# Patient Record
Sex: MALE | Race: WHITE | NOT HISPANIC OR LATINO | URBAN - METROPOLITAN AREA
[De-identification: names, ages, dates, MRNs, and addresses within clinical notes are randomized per-mention and may not be internally consistent; named-entity substitution may affect disease eponyms.]

---

## 2018-09-26 ENCOUNTER — APPOINTMENT (RX ONLY)
Dept: URBAN - METROPOLITAN AREA CLINIC 63 | Facility: CLINIC | Age: 73
Setting detail: DERMATOLOGY
End: 2018-09-26

## 2018-09-26 DIAGNOSIS — D485 NEOPLASM OF UNCERTAIN BEHAVIOR OF SKIN: ICD-10-CM

## 2018-09-26 PROBLEM — D48.5 NEOPLASM OF UNCERTAIN BEHAVIOR OF SKIN: Status: ACTIVE | Noted: 2018-09-26

## 2018-09-26 PROCEDURE — ? BIOPSY BY SHAVE METHOD

## 2018-09-26 PROCEDURE — 11100: CPT

## 2018-09-26 ASSESSMENT — PAIN INTENSITY VAS: HOW INTENSE IS YOUR PAIN 0 BEING NO PAIN, 10 BEING THE MOST SEVERE PAIN POSSIBLE?: NO PAIN

## 2018-09-26 ASSESSMENT — LOCATION SIMPLE DESCRIPTION DERM: LOCATION SIMPLE: LEFT FOREARM

## 2018-09-26 ASSESSMENT — LOCATION ZONE DERM: LOCATION ZONE: ARM

## 2018-09-26 ASSESSMENT — LOCATION DETAILED DESCRIPTION DERM: LOCATION DETAILED: LEFT DISTAL ULNAR DORSAL FOREARM

## 2018-09-26 NOTE — PROCEDURE: BIOPSY BY SHAVE METHOD
Detail Level: Detailed
X Size Of Lesion In Cm: 0
Electrodesiccation Text: The wound bed was treated with electrodesiccation after the biopsy was performed.
Billing Type: Third-Party Bill
Destruction After The Procedure: No
Anesthesia Volume In Cc (Will Not Render If 0): 0.5
Curettage Text: The wound bed was treated with curettage after the biopsy was performed.
Type Of Destruction Used: Curettage
Notification Instructions: Patient will be notified of biopsy results. However, patient instructed to call the office if not contacted within 2 weeks.
Consent: Written consent was obtained and risks were reviewed including but not limited to scarring, infection, bleeding, scabbing, incomplete removal, nerve damage and allergy to anesthesia.
Biopsy Method: Dermablade
Dressing: bandage
Depth Of Biopsy: dermis
Biopsy Type: H and E
Post-Care Instructions: I reviewed with the patient in detail post-care instructions. Patient is to keep the biopsy site dry overnight, and then apply bacitracin twice daily until healed. Patient may apply hydrogen peroxide soaks to remove any crusting.
Electrodesiccation And Curettage Text: The wound bed was treated with electrodesiccation and curettage after the biopsy was performed.
Hemostasis: Drysol
Silver Nitrate Text: The wound bed was treated with silver nitrate after the biopsy was performed.
Lab: [id number]
Was A Bandage Applied: Yes
Cryotherapy Text: The wound bed was treated with cryotherapy after the biopsy was performed.
Wound Care: Petrolatum
Anesthesia Type: 1% lidocaine with epinephrine
Lab Facility: [id number]

## 2019-05-15 ENCOUNTER — APPOINTMENT (RX ONLY)
Dept: URBAN - METROPOLITAN AREA CLINIC 63 | Facility: CLINIC | Age: 74
Setting detail: DERMATOLOGY
End: 2019-05-15

## 2019-05-15 DIAGNOSIS — F17.200 NICOTINE DEPENDENCE, UNSPECIFIED, UNCOMPLICATED: ICD-10-CM

## 2019-05-15 DIAGNOSIS — D18.0 HEMANGIOMA: ICD-10-CM

## 2019-05-15 DIAGNOSIS — L57.0 ACTINIC KERATOSIS: ICD-10-CM

## 2019-05-15 DIAGNOSIS — L82.1 OTHER SEBORRHEIC KERATOSIS: ICD-10-CM

## 2019-05-15 DIAGNOSIS — L57.8 OTHER SKIN CHANGES DUE TO CHRONIC EXPOSURE TO NONIONIZING RADIATION: ICD-10-CM

## 2019-05-15 DIAGNOSIS — D22 MELANOCYTIC NEVI: ICD-10-CM

## 2019-05-15 PROBLEM — D22.5 MELANOCYTIC NEVI OF TRUNK: Status: ACTIVE | Noted: 2019-05-15

## 2019-05-15 PROBLEM — D18.01 HEMANGIOMA OF SKIN AND SUBCUTANEOUS TISSUE: Status: ACTIVE | Noted: 2019-05-15

## 2019-05-15 PROCEDURE — ? COUNSELING

## 2019-05-15 PROCEDURE — 17000 DESTRUCT PREMALG LESION: CPT

## 2019-05-15 PROCEDURE — 99214 OFFICE O/P EST MOD 30 MIN: CPT | Mod: 25

## 2019-05-15 PROCEDURE — ? LIQUID NITROGEN

## 2019-05-15 ASSESSMENT — LOCATION DETAILED DESCRIPTION DERM
LOCATION DETAILED: LEFT SUPERIOR MEDIAL UPPER BACK
LOCATION DETAILED: RIGHT PROXIMAL DORSAL FOREARM
LOCATION DETAILED: LEFT DISTAL DORSAL FOREARM
LOCATION DETAILED: LEFT LATERAL FOREHEAD
LOCATION DETAILED: EPIGASTRIC SKIN
LOCATION DETAILED: RIGHT SUPERIOR UPPER BACK
LOCATION DETAILED: RIGHT INFERIOR UPPER BACK

## 2019-05-15 ASSESSMENT — PAIN INTENSITY VAS
HOW INTENSE IS YOUR PAIN 0 BEING NO PAIN, 10 BEING THE MOST SEVERE PAIN POSSIBLE?: NO PAIN
HOW INTENSE IS YOUR PAIN 0 BEING NO PAIN, 10 BEING THE MOST SEVERE PAIN POSSIBLE?: 2/10 PAIN

## 2019-05-15 ASSESSMENT — LOCATION SIMPLE DESCRIPTION DERM
LOCATION SIMPLE: LEFT FOREARM
LOCATION SIMPLE: LEFT FOREHEAD
LOCATION SIMPLE: RIGHT UPPER BACK
LOCATION SIMPLE: ABDOMEN
LOCATION SIMPLE: LEFT UPPER BACK
LOCATION SIMPLE: RIGHT FOREARM

## 2019-05-15 ASSESSMENT — LOCATION ZONE DERM
LOCATION ZONE: ARM
LOCATION ZONE: TRUNK
LOCATION ZONE: FACE

## 2019-05-15 NOTE — PROCEDURE: COUNSELING
Detail Level: Generalized
Quality 226: Preventive Care And Screening: Tobacco Use: Screening And Cessation Intervention: Patient screened for tobacco and never smoked
Detail Level: Detailed
Detail Level: Zone

## 2023-08-17 ENCOUNTER — APPOINTMENT (RX ONLY)
Dept: URBAN - METROPOLITAN AREA CLINIC 63 | Facility: CLINIC | Age: 78
Setting detail: DERMATOLOGY
End: 2023-08-17

## 2023-08-17 DIAGNOSIS — L82.1 OTHER SEBORRHEIC KERATOSIS: ICD-10-CM

## 2023-08-17 DIAGNOSIS — L57.8 OTHER SKIN CHANGES DUE TO CHRONIC EXPOSURE TO NONIONIZING RADIATION: ICD-10-CM

## 2023-08-17 DIAGNOSIS — D18.0 HEMANGIOMA: ICD-10-CM

## 2023-08-17 DIAGNOSIS — D22 MELANOCYTIC NEVI: ICD-10-CM

## 2023-08-17 DIAGNOSIS — D485 NEOPLASM OF UNCERTAIN BEHAVIOR OF SKIN: ICD-10-CM

## 2023-08-17 PROBLEM — D48.5 NEOPLASM OF UNCERTAIN BEHAVIOR OF SKIN: Status: ACTIVE | Noted: 2023-08-17

## 2023-08-17 PROBLEM — D22.5 MELANOCYTIC NEVI OF TRUNK: Status: ACTIVE | Noted: 2023-08-17

## 2023-08-17 PROBLEM — D18.01 HEMANGIOMA OF SKIN AND SUBCUTANEOUS TISSUE: Status: ACTIVE | Noted: 2023-08-17

## 2023-08-17 PROCEDURE — 99203 OFFICE O/P NEW LOW 30 MIN: CPT | Mod: 25

## 2023-08-17 PROCEDURE — ? BIOPSY BY SHAVE METHOD

## 2023-08-17 PROCEDURE — ? COUNSELING

## 2023-08-17 PROCEDURE — 11102 TANGNTL BX SKIN SINGLE LES: CPT

## 2023-08-17 ASSESSMENT — LOCATION SIMPLE DESCRIPTION DERM
LOCATION SIMPLE: GENITALIA
LOCATION SIMPLE: RIGHT UPPER BACK
LOCATION SIMPLE: RIGHT LOWER BACK
LOCATION SIMPLE: CHEST
LOCATION SIMPLE: RIGHT FOREARM
LOCATION SIMPLE: LEFT LIP

## 2023-08-17 ASSESSMENT — LOCATION ZONE DERM
LOCATION ZONE: GENITALIA
LOCATION ZONE: LIP
LOCATION ZONE: ARM
LOCATION ZONE: TRUNK

## 2023-08-17 ASSESSMENT — LOCATION DETAILED DESCRIPTION DERM
LOCATION DETAILED: GENITALIA
LOCATION DETAILED: RIGHT DISTAL DORSAL FOREARM
LOCATION DETAILED: RIGHT INFERIOR LATERAL MIDBACK
LOCATION DETAILED: RIGHT SUPERIOR MEDIAL UPPER BACK
LOCATION DETAILED: LEFT INFERIOR VERMILION LIP
LOCATION DETAILED: RIGHT INFERIOR UPPER BACK
LOCATION DETAILED: RIGHT MEDIAL SUPERIOR CHEST

## 2023-08-17 ASSESSMENT — PAIN INTENSITY VAS: HOW INTENSE IS YOUR PAIN 0 BEING NO PAIN, 10 BEING THE MOST SEVERE PAIN POSSIBLE?: NO PAIN

## 2023-08-25 ENCOUNTER — RX ONLY (OUTPATIENT)
Age: 78
Setting detail: RX ONLY
End: 2023-08-25

## 2023-08-25 RX ORDER — BETAMETHASONE DIPROPIONATE 0.5 MG/G
CREAM TOPICAL
Qty: 15 | Refills: 0 | Status: ERX | COMMUNITY
Start: 2023-08-25

## 2024-11-22 ENCOUNTER — APPOINTMENT (RX ONLY)
Dept: URBAN - METROPOLITAN AREA CLINIC 63 | Facility: CLINIC | Age: 79
Setting detail: DERMATOLOGY
End: 2024-11-22

## 2024-11-22 DIAGNOSIS — L82.1 OTHER SEBORRHEIC KERATOSIS: ICD-10-CM

## 2024-11-22 DIAGNOSIS — D18.0 HEMANGIOMA: ICD-10-CM

## 2024-11-22 DIAGNOSIS — B07.8 OTHER VIRAL WARTS: ICD-10-CM

## 2024-11-22 DIAGNOSIS — D22 MELANOCYTIC NEVI: ICD-10-CM

## 2024-11-22 DIAGNOSIS — L57.0 ACTINIC KERATOSIS: ICD-10-CM

## 2024-11-22 DIAGNOSIS — L57.8 OTHER SKIN CHANGES DUE TO CHRONIC EXPOSURE TO NONIONIZING RADIATION: ICD-10-CM

## 2024-11-22 PROBLEM — D18.01 HEMANGIOMA OF SKIN AND SUBCUTANEOUS TISSUE: Status: ACTIVE | Noted: 2024-11-22

## 2024-11-22 PROBLEM — D22.5 MELANOCYTIC NEVI OF TRUNK: Status: ACTIVE | Noted: 2024-11-22

## 2024-11-22 PROCEDURE — 99213 OFFICE O/P EST LOW 20 MIN: CPT | Mod: 25

## 2024-11-22 PROCEDURE — 17000 DESTRUCT PREMALG LESION: CPT | Mod: 59

## 2024-11-22 PROCEDURE — ? COUNSELING

## 2024-11-22 PROCEDURE — ? LIQUID NITROGEN

## 2024-11-22 PROCEDURE — 17003 DESTRUCT PREMALG LES 2-14: CPT | Mod: 59

## 2024-11-22 PROCEDURE — 17110 DESTRUCTION B9 LES UP TO 14: CPT

## 2024-11-22 ASSESSMENT — LOCATION SIMPLE DESCRIPTION DERM
LOCATION SIMPLE: LEFT LIP
LOCATION SIMPLE: LEFT TEMPLE
LOCATION SIMPLE: CHEST
LOCATION SIMPLE: RIGHT LOWER BACK
LOCATION SIMPLE: LEFT FOREHEAD
LOCATION SIMPLE: RIGHT SMALL FINGER
LOCATION SIMPLE: RIGHT FOREHEAD
LOCATION SIMPLE: RIGHT UPPER BACK

## 2024-11-22 ASSESSMENT — LOCATION DETAILED DESCRIPTION DERM
LOCATION DETAILED: LEFT INFERIOR FOREHEAD
LOCATION DETAILED: RIGHT DISTAL PALMAR SMALL FINGER
LOCATION DETAILED: RIGHT SUPERIOR MEDIAL UPPER BACK
LOCATION DETAILED: RIGHT SUPERIOR FOREHEAD
LOCATION DETAILED: LEFT INFERIOR VERMILION LIP
LOCATION DETAILED: RIGHT INFERIOR LATERAL MIDBACK
LOCATION DETAILED: RIGHT INFERIOR UPPER BACK
LOCATION DETAILED: LEFT MEDIAL TEMPLE
LOCATION DETAILED: RIGHT MEDIAL SUPERIOR CHEST

## 2024-11-22 ASSESSMENT — LOCATION ZONE DERM
LOCATION ZONE: FACE
LOCATION ZONE: FINGER
LOCATION ZONE: TRUNK
LOCATION ZONE: LIP

## 2024-11-22 NOTE — PROCEDURE: LIQUID NITROGEN
Medical Necessity Clause: This procedure was medically necessary because the lesions that were treated were: painful, pruritic and spreading
Spray Paint Text: The liquid nitrogen was applied to the skin utilizing a spray paint frosting technique.
Render Note In Bullet Format When Appropriate: No
Show Applicator Variable?: Yes
Pared With?: 15 blade
Medical Necessity Information: It is in your best interest to select a reason for this procedure from the list below. All of these items fulfill various CMS LCD requirements except the new and changing color options.
Consent: The patient's consent was obtained including but not limited to risks of crusting, scabbing, blistering, scarring, darker or lighter pigmentary change, recurrence, incomplete removal and infection.
Detail Level: Detailed
Post-Care Instructions: I reviewed with the patient in detail post-care instructions. Patient is to wear sunprotection, and avoid picking at any of the treated lesions. Pt may apply Vaseline to crusted or scabbing areas.
Number Of Freeze-Thaw Cycles: 2 freeze-thaw cycles
Duration Of Freeze Thaw-Cycle (Seconds): 0
Number Of Freeze-Thaw Cycles: 1 freeze-thaw cycle

## 2024-12-27 ENCOUNTER — APPOINTMENT (OUTPATIENT)
Dept: URBAN - METROPOLITAN AREA CLINIC 63 | Facility: CLINIC | Age: 79
Setting detail: DERMATOLOGY
End: 2024-12-27

## 2024-12-27 DIAGNOSIS — B07.8 OTHER VIRAL WARTS: ICD-10-CM

## 2024-12-27 PROCEDURE — ? COUNSELING

## 2024-12-27 PROCEDURE — ? LIQUID NITROGEN

## 2024-12-27 PROCEDURE — 17110 DESTRUCTION B9 LES UP TO 14: CPT

## 2024-12-27 ASSESSMENT — LOCATION ZONE DERM: LOCATION ZONE: FINGER

## 2024-12-27 ASSESSMENT — LOCATION DETAILED DESCRIPTION DERM: LOCATION DETAILED: RIGHT DISTAL PALMAR SMALL FINGER

## 2024-12-27 ASSESSMENT — LOCATION SIMPLE DESCRIPTION DERM: LOCATION SIMPLE: RIGHT SMALL FINGER

## 2024-12-27 NOTE — PROCEDURE: LIQUID NITROGEN
Medical Necessity Clause: This procedure was medically necessary because the lesions that were treated were: painful, pruritic and spreading
Spray Paint Text: The liquid nitrogen was applied to the skin utilizing a spray paint frosting technique.
Render Note In Bullet Format When Appropriate: No
Show Applicator Variable?: Yes
Pared With?: 15 blade
Medical Necessity Information: It is in your best interest to select a reason for this procedure from the list below. All of these items fulfill various CMS LCD requirements except the new and changing color options.
Consent: The patient's consent was obtained including but not limited to risks of crusting, scabbing, blistering, scarring, darker or lighter pigmentary change, recurrence, incomplete removal and infection.
Detail Level: Detailed
Post-Care Instructions: I reviewed with the patient in detail post-care instructions. Patient is to wear sunprotection, and avoid picking at any of the treated lesions. Pt may apply Vaseline to crusted or scabbing areas.
Number Of Freeze-Thaw Cycles: 2 freeze-thaw cycles

## 2025-01-31 ENCOUNTER — APPOINTMENT (OUTPATIENT)
Dept: URBAN - METROPOLITAN AREA CLINIC 63 | Facility: CLINIC | Age: 80
Setting detail: DERMATOLOGY
End: 2025-01-31

## 2025-01-31 DIAGNOSIS — B07.8 OTHER VIRAL WARTS: ICD-10-CM

## 2025-01-31 PROCEDURE — 17110 DESTRUCTION B9 LES UP TO 14: CPT

## 2025-01-31 PROCEDURE — ? COUNSELING

## 2025-01-31 PROCEDURE — ? LIQUID NITROGEN

## 2025-01-31 ASSESSMENT — LOCATION DETAILED DESCRIPTION DERM: LOCATION DETAILED: RIGHT DISTAL PALMAR SMALL FINGER

## 2025-01-31 ASSESSMENT — LOCATION ZONE DERM: LOCATION ZONE: FINGER

## 2025-01-31 ASSESSMENT — LOCATION SIMPLE DESCRIPTION DERM: LOCATION SIMPLE: RIGHT SMALL FINGER
